# Patient Record
Sex: MALE | Race: OTHER | NOT HISPANIC OR LATINO | URBAN - METROPOLITAN AREA
[De-identification: names, ages, dates, MRNs, and addresses within clinical notes are randomized per-mention and may not be internally consistent; named-entity substitution may affect disease eponyms.]

---
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
## 2023-06-12 NOTE — ED BEHAVIORAL HEALTH ASSESSMENT NOTE - PREFERRED LANGUAGE
Outpatient Medications Marked as Taking for the 6/11/23 encounter (Refill) with Aamir Stewart MD   Medication Sig Dispense Refill   • sildenafil (VIAGRA) 50 MG tablet Take 1 tablet by mouth daily as needed for Erectile Dysfunction. 90 tablet 0       Last Visit: 8/29/22  Next Visit: 8/30/2023    Refilled per [facility].   English

## 2025-01-13 NOTE — ED BEHAVIORAL HEALTH ASSESSMENT NOTE - NSBHSACONSEQUENCE_PSY_A_CORE FT
[de-identified] : Knee Joint Cortisone Injection, Left: Verbal consent was obtained from the patient for a left knee injection after the risks and benefits were discussed. The patient was made comfortable in the seated position with the knee at 90 degrees hanging over the table. The patients knee injection site was then sterilely prepped. Local anesthetic was used to desensitize the skin prior to the injection. A sterile 22-guage needle on a sterile syringe was used to introduce the injectable liquid into the knee joint through an inferior parapatellar tendon approach. The needle was withdrawn, and a sterile band-aid was placed over the injection site. Pressure was applied to the injection site for several minutes to help with hemostasis at the injection site. The patient tolerated the procedure well.   Medications injected into the knee: a. Steroid: Celestone 6mg/ml, 1ml injected. b. Local anesthetic: Lidocaine1% 1ml, Marcaine 0.25% 1ml  see HPI